# Patient Record
Sex: MALE | Race: WHITE
[De-identification: names, ages, dates, MRNs, and addresses within clinical notes are randomized per-mention and may not be internally consistent; named-entity substitution may affect disease eponyms.]

---

## 2019-09-18 ENCOUNTER — HOSPITAL ENCOUNTER (INPATIENT)
Dept: HOSPITAL 39 - ER | Age: 29
LOS: 1 days | Discharge: HOME | DRG: 343 | End: 2019-09-19
Attending: SURGERY | Admitting: SURGERY
Payer: SELF-PAY

## 2019-09-18 DIAGNOSIS — F17.210: ICD-10-CM

## 2019-09-18 DIAGNOSIS — K35.80: Primary | ICD-10-CM

## 2019-09-18 PROCEDURE — BW211ZZ COMPUTERIZED TOMOGRAPHY (CT SCAN) OF ABDOMEN AND PELVIS USING LOW OSMOLAR CONTRAST: ICD-10-PCS

## 2019-09-18 PROCEDURE — 0DTJ4ZZ RESECTION OF APPENDIX, PERCUTANEOUS ENDOSCOPIC APPROACH: ICD-10-PCS | Performed by: SURGERY

## 2019-09-18 RX ADMIN — SODIUM CHLORIDE, POTASSIUM CHLORIDE, SODIUM LACTATE AND CALCIUM CHLORIDE PRN MLS: 600; 310; 30; 20 INJECTION, SOLUTION INTRAVENOUS at 11:43

## 2019-09-18 RX ADMIN — CEFOXITIN SODIUM SCH MLS/HR: 2 POWDER, FOR SOLUTION INTRAVENOUS at 12:18

## 2019-09-18 RX ADMIN — SODIUM CHLORIDE, POTASSIUM CHLORIDE, SODIUM LACTATE AND CALCIUM CHLORIDE PRN MLS/HR: 600; 310; 30; 20 INJECTION, SOLUTION INTRAVENOUS at 12:18

## 2019-09-18 RX ADMIN — CEFOXITIN SODIUM SCH MLS/HR: 2 POWDER, FOR SOLUTION INTRAVENOUS at 19:38

## 2019-09-18 RX ADMIN — HYDROCODONE BITARTRATE AND ACETAMINOPHEN PRN EA: 5; 325 TABLET ORAL at 15:04

## 2019-09-18 RX ADMIN — SODIUM CHLORIDE, POTASSIUM CHLORIDE, SODIUM LACTATE AND CALCIUM CHLORIDE PRN MLS/HR: 600; 310; 30; 20 INJECTION, SOLUTION INTRAVENOUS at 07:02

## 2019-09-18 RX ADMIN — HYDROCODONE BITARTRATE AND ACETAMINOPHEN PRN EA: 5; 325 TABLET ORAL at 20:42

## 2019-09-18 NOTE — CT
CT ABDOMEN AND PELVIS WITH CONTRAST. 



CLINICAL HISTORY: Abdominal pain.



COMPARISON: [None.]



TECHNIQUE:



Axial CT imaging of the abdomen and pelvis performed with

intravenous contrast. Reformatted coronal and sagittal images

reviewed.



A dose reduction technique was utilized with automated exposure

control according to patient size.



FINDINGS:



Clear lung bases. Heart is normal in size.



Normal liver, gallbladder, spleen, pancreas, adrenal glands, and

kidneys. Aorta and inferior vena cava are normal in caliber. No

adenopathy. Mesenteric vessels appear normal.



Normal stomach. Small bowel caliber is diffusely normal. No

evidence of small bowel thickening. The appendix contains

numerous intraluminal appendicoliths. The appendix is 1.1 cm.

There is no periappendiceal edema. Mild descending colon

diverticulosis. No diverticulitis. No mesenteric edema. No

ascites or free air.



Bladder is mildly thickened. Bladder is not fully distended.

Normal prostate. No pelvic free fluid.



There is a pars defect bilaterally at L5. There is minimal

anterior subluxation of L5 on S1. Intact bony pelvis. Normal

hips.



IMPRESSION:



1. Acute appendicitis with numerous appendicoliths. No evidence

of abscess or free air.

2. Mild descending colon diverticulosis without diverticulitis.

3. Bladder wall appears thickened, likely due to incomplete

distention. Correlate with urinalysis.

4. Bilateral L5 spondylolysis with grade 1 spondylolisthesis of

L5 on S1..



Electronically signed by:  Rocio Arzate DO  9/18/2019 5:43 AM CDT

Workstation: [id number]

## 2019-09-18 NOTE — ED.PDOC
History of Present Illness





- General


Chief Complaint: Abdominal Pain


Stated Complaint: abdominal pain


Time Seen by Provider: 09/18/19 04:23





- History of Present Illness


Initial Comments: 





30 yo M no significant PMH presents to ED c/o generalized abdominal pain since 

6pm. Denies fever chills nausea vomiting diarrhea chest pain sob diaphoresis. No

change in bowel or bladder, symptoms have disturbed rest and appetite. Admits 

drinking and smoking denies FH HTN DM. Has no PMD for follow up. No other c/o 

today. 





Review of Systems





- Review of Systems


Constitutional: States: see HPI


EENTM: States: see HPI


Respiratory: States: see HPI


Cardiology: States: see HPI


Gastrointestinal/Abdominal: States: abdominal pain


Genitourinary: States: see HPI


Musculoskeletal: States: see HPI


Skin: States: no symptoms reported


Neurological: States: no symptoms reported


Endocrine: States: no symptoms reported


Hematologic/Lymphatic: States: no symptoms reported


All other Systems: Reviewed and Negative





Past Medical History (General)





- Patient Medical History


Hx Asthma: Yes - childhood


Hx Diabetes: No


Surgical History: no surgical history





- Vaccination History


Hx Influenza Vaccination: No





- Social History


Hx Alcohol Use: Yes - daily





Family Medical History





- Family History


  ** Mother


Family History: Unknown





Physical Exam





- Physical Exam


General Appearance: Other - uncomfortable


Eyes, Ears, Nose, Throat Exam: normal ENT inspection


Neck: non-tender, full range of motion


Respiratory: normal breath sounds


Cardiovascular/Chest: regular rate, rhythm


Gastrointestinal/Abdominal: soft, tenderness - tender RLQ>LLQ


Back Exam: normal inspection


Extremity: normal range of motion


Neurologic: no motor/sensory deficits


Skin Exam: normal color





Progress





- Progress


Progress: 





09/18/19 04:43


A/P-Abdominal Pain


1.iv bolus tylenol GI Cocktail cbc cmp urinalysis lipase ct abdomen pelvis with 

IV contrast reassess


09/18/19 06:26


                                Laboratory Tests











  09/18/19 09/18/19





  04:39 04:39


 


WBC  11.2 H 


 


RBC  5.16 


 


Hgb  16.6 


 


Hct  47.3 


 


MCV  91.6 


 


MCH  32.1 H 


 


MCHC  35.0 


 


RDW  12.7 


 


Plt Count  279 


 


MPV  8.6 


 


Absolute Neuts (auto)  9.00 H 


 


Absolute Lymphs (auto)  1.20 


 


Absolute Monos (auto)  0.90 H 


 


Absolute Eos (auto)  0.10 


 


Absolute Basos (auto)  0.00 


 


Neutrophils %  80.4 H 


 


Lymphocytes %  10.5 L 


 


Monocytes %  8.2 


 


Eosinophils %  0.5 L 


 


Basophils %  0.4 


 


Sodium   133 L


 


Potassium   3.2 L


 


Chloride   99 L


 


Carbon Dioxide   21


 


Anion Gap   16.2


 


BUN   6 L


 


Creatinine   0.67


 


BUN/Creatinine Ratio   9.0 L


 


Random Glucose   114 H


 


Serum Osmolality   264.9 L


 


Calcium   9.3


 


Total Bilirubin   1.0


 


AST   22


 


ALT   24


 


Alkaline Phosphatase   55


 


Serum Total Protein   7.9


 


Albumin   4.9


 


Globulin   3.0


 


Albumin/Globulin Ratio   1.6


 


Lipase   28








CT Shows acute appendicitis, called Surgeon Dr. Behr who accepts patient will 

admit blood cultures x 2 mefoxin NPO





Departure





- Departure


Clinical Impression: 


Acute appendicitis


Qualifiers:


 Acute appendicitis type: unspecified acute appendicitis type Qualified Code(s):

K35.80 - Unspecified acute appendicitis





Abdominal pain


Qualifiers:


 Abdominal location: right lower quadrant Qualified Code(s): R10.31 - Right 

lower quadrant pain





Time of Disposition: 06:33


Disposition: Admit Patient


Condition: Fair


Departure Forms:  ED Discharge - Pt. Copy, Patient Portal Self Enrollment


Instructions:  DI for Abdominal Pain-Adult


Home Medications: 


Ambulatory Orders





NK  09/18/19 











Decision To Admit





- Decistion To Admit


Decision to Admit Reason: Admit from ER


Decision to Admit Date: 09/18/19


Decision to Admit Time: 06:31

## 2019-09-18 NOTE — OP
DATE OF PROCEDURE:  09/18/19



PREOPERATIVE DIAGNOSIS: 

1.  Right lower quadrant abdominal pain.

2.  Leukocytosis.

3.  Abnormal CT scan suspicious for appendicitis.



POSTOPERATIVE DIAGNOSIS: 

1.  Right lower quadrant abdominal pain.

2.  Leukocytosis.

3.  Abnormal CT scan suspicious for appendicitis.

4.  Acute appendicitis.



PROCEDURE: 

1.  Laparoscopy.  

2.  Appendectomy.



SURGEON:  Donald A. Behr, MD.



ASSISTANT:  None.



ANESTHESIA: General endotracheal anesthesia and local infiltration of 0.25% 
Marcaine with epinephrine.



INDICATION:  The patient is a 29-year-old male who developed abdominal 
discomfort and nausea last night.  It worsened despite him causing himself to 
vomit.  He presented to the Emergency Room and was found to have elevated white 
count of 11,000.  CT scan revealed a dilated appendix with multiple 
appendicoliths and small amount of wall thickening.  He was admitted, started on
IV Mefoxin and brought to the Surgical Suite this morning for a laparoscopic 
appendectomy.



FINDINGS:  The appendix was distended, tense.  There was no exudate or fluid 
collection with a small amount of clear serous fluid in the pelvis.



PROCEDURE:  After adequate general endotracheal anesthesia was obtained, the 
patient was shaved.  A Wood catheter was placed.  He was prepped and draped in 
the usual sterile manner.  At this time, a surgical time-out was taken.  The 
infraumbilical area was infiltrated with local anesthesia.  A curvilinear 
incision was fashioned and carried down through the subcutaneous tissue to the 
midline fascia.  Traction sutures were placed on either side of the midline.  A 
small incision was made in the midline fascia.  The peritoneum was opened 
bluntly.  Toño trocar was introduced under direct vision into the abdominal 
cavity and fixed in place with a 20 mL balloon.  CO2 was then insufflated until 
a pressure of 12 mmHg was reached and the abdomen was tympanitic in all four 
quadrants.  When this was done, the laparoscope was introduced and the abdomen 
was inspected with the previously noted findings.  This included inspecting the 
upper abdomen.  When this was done, a suprapubic port was placed under direct 
vision in the usual manner.  The right lower quadrant was explored and the 
appendix was identified.  At this point, the left lower quadrant port was placed
under direct vision in the usual manner.  When this was done, the appendix was 
elevated.  The base of the appendix was identified.  The mesoappendix at the 
base of the appendix was divided using blunt dissection. The  Endo-GA was with 
a vascular load was then introduced and the base of the appendix was stapled and
divided, as was the mesoappendix.  There was still a small amount of tissue that
had not been divided.  This was generally clear tissue that was divided with 
sharp dissection.  The appendix was then placed in an EndoCatch bag and removed 
from the left lower quadrant port in the usual manner under direct vision.  When
this was done, the right lower quadrant and pelvis were both inspected and 4x4s 
were introduced through the port and used to mop up the fluid and blood.  When 
this was done with 2 sponges and they were removed, again, the right lower 
quadrant, the base of the appendix, base of the cecum were inspected.  There was
no active bleeding.  No other pathology was identified.  At this point, the left
lower quadrant port was removed and the port site fascia was approximated with 
two simple sutures of 0 Vicryl placed using the EndoClose device.  When these 
were tightened and tied, hemostasis was noted to be good.  At this point, the 
CO2, the laparoscope and the infraumbilical port were removed.  The 
infraumbilical port site fascia was approximated with a single figure-of-eight 
suture of 0 Vicryl. Subcutaneous tissue was irrigated with saline.  Skin edges 
were approximated loosely with skin staples.  Sterile dressings were applied.   
The patient was awakened, Wood catheter was removed and taken to the Recovery 
Room in stable condition.



Estimated blood loss was less than 50 mL.  All sponge, needle and instrument 
counts were correct.



#[id number]

[facility]

## 2019-09-18 NOTE — HP
CHIEF COMPLAINT:  Abdominal pain.



HISTORY OF PRESENT ILLNESS:  The patient is a 29-year-old male who was in his 
normal state of good health until approximately 6 o'clock last night when he 
developed lower abdominal pain and associated nausea.  He forced himself to 
vomit without improvement of his discomfort.  He had a small amount of mashed 
potatoes last evening at approximately 10 o'clock and states he had 2 alcoholic 
drinks last night.  His pain worsened.  He presented to the Emergency Room and 
was found to have elevated white blood cell count.  CT scan was obtained and was
consistent with acute appendicitis with no free air or free fluid.  He was 
admitted for IV antibiotic therapy followed by appendectomy today.



PAST MEDICAL HISTORY:  Insignificant.  



MEDICATIONS:  He takes no medications on a routine basis.



ALLERGIES:  NO KNOWN DRUG ALLERGIES.



FAMILY HISTORY:  Noncontributory.



SOCIAL HISTORY:  The patient is single.  He works as an  at the
[employer].  He has less than a 10-pack year history of tobacco abuse.  He 
drinks alcohol most evenings, moderate to large amount.  He denies inability to 
go without alcohol, however.  



REVIEW OF SYSTEMS:  There has been no weight loss.  No change in bowel habits.  
No upper respiratory symptoms, no shortness of breath or chest pain.  He denies 
urinary tract symptoms.



PHYSICAL EXAMINATION:



GENERAL:  The patient is awake, alert, cooperative, in minimally acute distress.



VITAL SIGNS:  The patient is currently afebrile.  Pulse rate 60.  Blood pressure
145/100.  Pulse oximetry 99% on room air.  Respiratory rate 18.  



HEENT:  Sclerae nonicteric.  Mucous membranes minimally dry.



NECK:  Without adenopathy.



BACK:  Without CVA tenderness.



CHEST:  Equal breath sounds bilaterally.



HEART:  Regular rhythm.



ABDOMEN:  Soft.  There is tenderness in the right lower quadrant without mass or
guarding.



RECTAL:  Deferred.



EXTREMITIES:  Without cyanosis, clubbing or edema.



LABORATORY:  White blood cell count 11,000, hemoglobin 16.6, platelet count 
379,000, 80% neutrophils.  Potassium 3.2.  Random blood sugar 114.  Creatinine 
0.67.  Liver functions within normal limits.  Lipase 28.  Urine shows specific 
gravity 1.015, ketones 40, leukocyte esterase negative, bacteria 0.  



RADIOLOGY:  CT scan of his abdomen revealed multiple appendicoliths with a 
dilated appendix consistent with acute appendicitis.  



ASSESSMENT:

1.  Abdominal pain.

2.  Leukocytosis.

3.  Abnormal CT scan suspicious for appendicitis.



PLAN:  The risks, benefits and alternatives to laparoscopy, appendectomy and 
indicated procedures were discussed with the patient.  His questions were 
answered.  The plan is to proceed when an Operating Room is available later this
morning or early afternoon.



#[id number]

[facility]

## 2019-09-19 VITALS — SYSTOLIC BLOOD PRESSURE: 115 MMHG | DIASTOLIC BLOOD PRESSURE: 78 MMHG | TEMPERATURE: 98.2 F

## 2019-09-19 VITALS — OXYGEN SATURATION: 96 %

## 2019-09-19 RX ADMIN — HYDROCODONE BITARTRATE AND ACETAMINOPHEN PRN EA: 5; 325 TABLET ORAL at 00:47

## 2019-09-19 RX ADMIN — SODIUM CHLORIDE, POTASSIUM CHLORIDE, SODIUM LACTATE AND CALCIUM CHLORIDE PRN MLS/HR: 600; 310; 30; 20 INJECTION, SOLUTION INTRAVENOUS at 00:46

## 2019-09-19 RX ADMIN — CEFOXITIN SODIUM SCH MLS/HR: 2 POWDER, FOR SOLUTION INTRAVENOUS at 04:02

## 2019-09-19 RX ADMIN — HYDROCODONE BITARTRATE AND ACETAMINOPHEN PRN EA: 5; 325 TABLET ORAL at 06:08

## 2019-09-19 NOTE — DS
FINAL DIAGNOSIS:

1.  Acute appendicitis pending pathology report.



SURGICAL PROCEDURE:

On 09/18/19, the patient underwent laparoscopic appendectomy.  



HISTORY OF PRESENT ILLNESS:  The patient is a 29-year-old male who was in his 
normal state of good health until approximately 6 o'clock last night when he 
developed lower abdominal pain and associated nausea.  He forced himself to 
vomit without improvement of his discomfort.  He had a small amount of mashed 
potatoes last evening at approximately 10 o'clock and states he had 2 alcoholic 
drinks last night.  His pain worsened.  He presented to the Emergency Room and 
was found to have elevated white blood cell count.  CT scan was obtained and was
consistent with acute appendicitis with no free air or free fluid.  He was 
admitted for IV antibiotic therapy followed by appendectomy today.



LABORATORY:  On the date of admission, white blood cell count was 11.2 with 82% 
neutrophils.  Hemoglobin had dropped from 16.6 to 14.8, platelet count from 279 
to 215.  Pathology report is pending.  



HOSPITAL COURSE:  The patient was admitted through the Emergency Room and given 
a dose of IV Mefoxin.  He was taken to the Operating Room where he underwent the
laparoscopic appendectomy without problems.  He tolerated liquids and ambulated.
 The first postoperative day, he was passing gas.  His abdomen was noted to be 
soft with active bowel sounds on postoperative day #1.  He was voiding without 
difficulty although his urine was still somewhat concentrated.  At this time at 
8:40, he is discharged home.



CONDITION ON DISCHARGE:  Good.



PROGNOSIS:  Excellent pathology report.  



DISPOSITION:  The patient is to call my office for an appointment for 09/30/19. 
He is discharged on a regular diet, told to push fluids and to walk, but do no 
lifting or exercise.  He was told he can shower, but not tub bath.  He is 
discharged on a prescription for Ceftin 500 mg and Norco 5.  He is instructed to
call me if he has any questions or problems prior to his appointment.



#[id number]

[facility]

## 2023-11-13 ENCOUNTER — APPOINTMENT (RX ONLY)
Dept: URBAN - METROPOLITAN AREA CLINIC 155 | Facility: CLINIC | Age: 33
Setting detail: DERMATOLOGY
End: 2023-11-13

## 2023-11-13 DIAGNOSIS — B07.8 OTHER VIRAL WARTS: ICD-10-CM | Status: INADEQUATELY CONTROLLED

## 2023-11-13 DIAGNOSIS — L85.3 XEROSIS CUTIS: ICD-10-CM | Status: INADEQUATELY CONTROLLED

## 2023-11-13 PROCEDURE — ? COUNSELING

## 2023-11-13 PROCEDURE — 99204 OFFICE O/P NEW MOD 45 MIN: CPT | Mod: 25

## 2023-11-13 PROCEDURE — 17110 DESTRUCTION B9 LES UP TO 14: CPT

## 2023-11-13 PROCEDURE — ? LIQUID NITROGEN

## 2023-11-13 PROCEDURE — ? PRESCRIPTION

## 2023-11-13 PROCEDURE — ? PRESCRIPTION MEDICATION MANAGEMENT

## 2023-11-13 RX ORDER — PHARMACY COMPOUNDING ACCESSORY
EACH MISCELLANEOUS
Qty: 30 | Refills: 1 | Status: ERX | COMMUNITY
Start: 2023-11-13

## 2023-11-13 RX ADMIN — Medication: at 00:00

## 2023-11-13 ASSESSMENT — LOCATION DETAILED DESCRIPTION DERM
LOCATION DETAILED: RIGHT DISTAL DORSAL MIDDLE FINGER
LOCATION DETAILED: RIGHT MIDDLE DISTAL INTERPHALANGEAL JOINT
LOCATION DETAILED: RIGHT MIDDLE PROXIMAL INTERPHALANGEAL JOINT
LOCATION DETAILED: LEFT SUPERIOR MEDIAL MALAR CHEEK
LOCATION DETAILED: RIGHT DISTAL RADIAL DORSAL MIDDLE FINGER
LOCATION DETAILED: RIGHT RING DISTAL INTERPHALANGEAL JOINT
LOCATION DETAILED: RIGHT DISTAL DORSAL RING FINGER
LOCATION DETAILED: LEFT MEDIAL INFERIOR CHEST
LOCATION DETAILED: RIGHT PROXIMAL RADIAL DORSAL MIDDLE FINGER

## 2023-11-13 ASSESSMENT — LOCATION SIMPLE DESCRIPTION DERM
LOCATION SIMPLE: RIGHT MIDDLE FINGER
LOCATION SIMPLE: CHEST
LOCATION SIMPLE: RIGHT RING FINGER
LOCATION SIMPLE: LEFT CHEEK

## 2023-11-13 ASSESSMENT — LOCATION ZONE DERM
LOCATION ZONE: FACE
LOCATION ZONE: TRUNK
LOCATION ZONE: FINGER

## 2023-11-13 NOTE — PROCEDURE: PRESCRIPTION MEDICATION MANAGEMENT
Detail Level: Zone
Render In Strict Bullet Format?: No
Initiate Treatment: Cimetidine 200mg tabs, 800 mg tid-OTC